# Patient Record
Sex: FEMALE | NOT HISPANIC OR LATINO | RURAL
[De-identification: names, ages, dates, MRNs, and addresses within clinical notes are randomized per-mention and may not be internally consistent; named-entity substitution may affect disease eponyms.]

---

## 2024-01-22 ENCOUNTER — APPOINTMENT (RX ONLY)
Dept: RURAL CLINIC 2 | Facility: CLINIC | Age: 39
Setting detail: DERMATOLOGY
End: 2024-01-22

## 2024-01-22 DIAGNOSIS — L64.8 OTHER ANDROGENIC ALOPECIA: ICD-10-CM

## 2024-01-22 PROCEDURE — 99203 OFFICE O/P NEW LOW 30 MIN: CPT

## 2024-01-22 PROCEDURE — ? DIAGNOSIS COMMENT

## 2024-01-22 PROCEDURE — ? RECOMMENDATIONS

## 2024-01-22 NOTE — PROCEDURE: RECOMMENDATIONS
Recommendations (Free Text): We discussed at length my concerns, as noted in the diagnosis comment above.  I strongly recommended a hormonal workup, as well as staying off of her supplemental \"anabolic steroids\".  In view of the amenorrhea, I recommended that she see her gynecologist for a thorough hormonal evaluation.  I offered to order some of these tests today, but I would recommend this being evaluated by her gynecologist.  Pending those results, I would be happy to see her in follow-up if her hair loss does not improve.\\n\\nDespite this conversation, patient stated that she did not want to \"go down a rabbit hole\" and just wanted me to prescribe a \"shampoo or some vitamins\" that would make her hair grow back.  I explained that unfortunately I do not think there are any shampoos or vitamins that will help her.  I did suggest that Rogaine 5% foam might be of some benefit (daily use).  However, I reemphasized strongly that she see her gynecologist and have a hormonal workup and evaluation for her amenorrhea.  We discussed the potential seriousness and potential adverse health effects if she were to have a tumor or some other hormonal abnormality.  I also reiterated the potential risks of continuing with her supplemental \"anabolic steroids\" and recommended she stop using them.
Render Risk Assessment In Note?: no
Recommendation Preamble: The following recommendations were made during the visit:
Detail Level: Zone

## 2024-01-22 NOTE — PROCEDURE: DIAGNOSIS COMMENT
Detail Level: Simple
Comment: Patient has been on \"anabolic steroids\" for 5 years.  She takes this \"in cycles\", which apparently means she stops this for a couple of months periodically, but then resumes.  She apparently takes these for workout/body enhancement.  Since starting this, she has had no menstrual cycle for the entire 5 years.  She states she did not notice the hair loss beginning until about 4 months ago.  She denies any other significant physical or mental stressors, major surgeries, etc..  She does use hair extensions to help to fill out the area of hair loss on the vertex.\\n\\nPattern of hair loss most suggestive of androgenetic alopecia.  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  I am concerned that the \"anabolic steroids\" may be contributing to this as well as her amenorrhea, however, cannot rule out other hormonal causes for these issues.
Render Risk Assessment In Note?: no

## 2024-01-22 NOTE — HPI: HAIR LOSS
Previous Labs: No
How Did The Hair Loss Occur?: sudden in onset
How Severe Is Your Hair Loss?: moderate
What Hair Products Do You Use?: Sauce